# Patient Record
Sex: MALE | Race: WHITE | NOT HISPANIC OR LATINO | Employment: UNEMPLOYED | URBAN - METROPOLITAN AREA
[De-identification: names, ages, dates, MRNs, and addresses within clinical notes are randomized per-mention and may not be internally consistent; named-entity substitution may affect disease eponyms.]

---

## 2023-05-03 ENCOUNTER — HOSPITAL ENCOUNTER (EMERGENCY)
Facility: HOSPITAL | Age: 43
Discharge: HOME | End: 2023-05-03
Attending: EMERGENCY MEDICINE

## 2023-05-03 ENCOUNTER — APPOINTMENT (EMERGENCY)
Dept: RADIOLOGY | Facility: HOSPITAL | Age: 43
End: 2023-05-03

## 2023-05-03 VITALS
DIASTOLIC BLOOD PRESSURE: 96 MMHG | RESPIRATION RATE: 20 BRPM | OXYGEN SATURATION: 99 % | TEMPERATURE: 98.1 F | SYSTOLIC BLOOD PRESSURE: 151 MMHG | HEART RATE: 101 BPM

## 2023-05-03 DIAGNOSIS — R51.9 ACUTE NONINTRACTABLE HEADACHE, UNSPECIFIED HEADACHE TYPE: Primary | ICD-10-CM

## 2023-05-03 RX ORDER — IBUPROFEN 400 MG/1
400 TABLET ORAL ONCE
Status: DISCONTINUED | OUTPATIENT
Start: 2023-05-03 | End: 2023-05-03 | Stop reason: HOSPADM

## 2023-05-03 RX ORDER — ACETAMINOPHEN 325 MG/1
650 TABLET ORAL ONCE
Status: DISCONTINUED | OUTPATIENT
Start: 2023-05-03 | End: 2023-05-03 | Stop reason: HOSPADM

## 2023-05-03 NOTE — ED PROVIDER NOTES
History  Chief Complaint   Patient presents with   • Migraine     Pt  C/o left sided pounding headache, with left arm shooting pain, and left leg pain  Headache started earlier in the day and other symptoms started an hour ago  Patient presents to the emergency department with a cute onset of a left-sided temporal headache left-sided posterior neck pain and some numbness and tingling in his left arm  Symptoms began suddenly yesterday while at work and have persisted  He has no prior history of a headache syndrome  At worst headache was rated a 8 out of 10 but currently in the emergency department it is rated a 9 out of 10  It is not made any worse with head movement or exertion  Patient did not take any medication to alleviate the headache  He does not take any antiplatelet agents or blood thinners  Arm symptoms have since resolved  Patient does not have a primary physician  He denies taking any chronic medications  History provided by:  Patient   used: No          None       History reviewed  No pertinent past medical history  History reviewed  No pertinent surgical history  History reviewed  No pertinent family history  I have reviewed and agree with the history as documented  E-Cigarette/Vaping   • E-Cigarette Use Current Every Day User      E-Cigarette/Vaping Substances   • Nicotine Yes    • Flavoring Yes           Review of Systems   Constitutional: Negative for fever  Eyes: Negative for visual disturbance  Cardiovascular: Negative for chest pain  Gastrointestinal: Negative for nausea and vomiting  Musculoskeletal: Positive for neck pain  Skin: Negative for color change  Neurological: Positive for numbness and headaches  Negative for speech difficulty and weakness  All other systems reviewed and are negative  Physical Exam  Physical Exam  Vitals and nursing note reviewed  Constitutional:       General: He is not in acute distress  Appearance: He is well-developed  He is not ill-appearing or toxic-appearing  HENT:      Head: Normocephalic and atraumatic  Mouth/Throat:      Mouth: Mucous membranes are moist    Eyes:      Conjunctiva/sclera: Conjunctivae normal    Neck:      Trachea: Trachea and phonation normal       Meningeal: Brudzinski's sign and Kernig's sign absent  Cardiovascular:      Rate and Rhythm: Normal rate and regular rhythm  Pulses: Normal pulses  Heart sounds: No murmur heard  Pulmonary:      Effort: Pulmonary effort is normal  No respiratory distress  Breath sounds: Normal breath sounds  Abdominal:      General: Bowel sounds are normal       Palpations: Abdomen is soft  Tenderness: There is no abdominal tenderness  Musculoskeletal:         General: No swelling  Cervical back: Normal range of motion and neck supple  No rigidity  Muscular tenderness present  No spinous process tenderness  Normal range of motion  Lymphadenopathy:      Cervical: No cervical adenopathy  Skin:     General: Skin is warm and dry  Capillary Refill: Capillary refill takes less than 2 seconds  Neurological:      General: No focal deficit present  Mental Status: He is alert and oriented to person, place, and time  Mental status is at baseline  Cranial Nerves: No cranial nerve deficit  Sensory: No sensory deficit  Motor: No weakness     Psychiatric:         Mood and Affect: Mood normal          Vital Signs  ED Triage Vitals [05/03/23 0138]   Temperature Pulse Respirations Blood Pressure SpO2   98 1 °F (36 7 °C) 101 20 151/96 99 %      Temp Source Heart Rate Source Patient Position - Orthostatic VS BP Location FiO2 (%)   Oral Monitor Sitting Right arm --      Pain Score       --           Vitals:    05/03/23 0138   BP: 151/96   Pulse: 101   Patient Position - Orthostatic VS: Sitting         Visual Acuity      ED Medications  Medications   acetaminophen (TYLENOL) tablet 650 mg (has no administration in time range)   ibuprofen (MOTRIN) tablet 400 mg (has no administration in time range)       Diagnostic Studies  Results Reviewed     None                 CT head without contrast    (Results Pending)              Procedures  Procedures         ED Course                               SBIRT 20yo+    Flowsheet Row Most Recent Value   Initial Alcohol Screen: US AUDIT-C     1  How often do you have a drink containing alcohol? 1 Filed at: 05/03/2023 0140   2  How many drinks containing alcohol do you have on a typical day you are drinking? 0 Filed at: 05/03/2023 0140   3a  Male UNDER 65: How often do you have five or more drinks on one occasion? 1 Filed at: 05/03/2023 0140   Audit-C Score 2 Filed at: 05/03/2023 0140   CASEY: How many times in the past year have you    Used an illegal drug or used a prescription medication for non-medical reasons? Never Filed at: 05/03/2023 0140                    Medical Decision Making  80-year-old healthy male presents with acute left-sided headache with some associated neck pain and paresthesia in the left arm  Differential diagnosis includes but is not limited to CVA/TIA, brain tumor, subarachnoid hemorrhage, cluster headache, tension headache, migraine headache  CT scan of his head shows no acute abnormality  Patient's blood pressure was noted to be elevated which she states he does not have chronically  He has serial neurological examinations that are benign  Patient refused pain medication here in the emergency department  I have discussed with him the need to follow-up with her primary doctor regarding his headache as well as his elevated blood pressures  He is provided with a number to [facility] for outpatient follow-up  Patient agrees with plan as discussed and all questions answered prior to discharge  Amount and/or Complexity of Data Reviewed  Radiology: ordered  Risk  OTC drugs    Prescription drug management  Disposition  Final diagnoses:   None     ED Disposition     None      Follow-up Information    None         Patient's Medications    No medications on file       No discharge procedures on file      PDMP Review     None          ED Provider  Electronically Signed by           Sathya Powers MD  05/03/23 7836

## 2023-05-03 NOTE — DISCHARGE INSTRUCTIONS
- See dermatology for skin tag.    - Need to see gynecologist Dr. Danita Rivers M.D. for follow up.    ( [phone])    - Get mammogram done.    - Get blood test done.    - Follow up with Dr. Perez for annual physical.        Return to the ED at any time for any new or worsening symptoms